# Patient Record
Sex: MALE | Race: WHITE | URBAN - METROPOLITAN AREA
[De-identification: names, ages, dates, MRNs, and addresses within clinical notes are randomized per-mention and may not be internally consistent; named-entity substitution may affect disease eponyms.]

---

## 2017-08-04 ENCOUNTER — TELEPHONE (OUTPATIENT)
Dept: FAMILY MEDICINE | Facility: CLINIC | Age: 47
End: 2017-08-04

## 2017-08-04 ENCOUNTER — NURSE TRIAGE (OUTPATIENT)
Dept: NURSING | Facility: CLINIC | Age: 47
End: 2017-08-04

## 2017-08-04 ENCOUNTER — OFFICE VISIT (OUTPATIENT)
Dept: FAMILY MEDICINE | Facility: CLINIC | Age: 47
End: 2017-08-04
Payer: COMMERCIAL

## 2017-08-04 VITALS
DIASTOLIC BLOOD PRESSURE: 74 MMHG | TEMPERATURE: 100.4 F | HEART RATE: 113 BPM | BODY MASS INDEX: 29.71 KG/M2 | SYSTOLIC BLOOD PRESSURE: 118 MMHG | RESPIRATION RATE: 18 BRPM | OXYGEN SATURATION: 96 % | HEIGHT: 67 IN | WEIGHT: 189.3 LBS

## 2017-08-04 DIAGNOSIS — R07.0 THROAT PAIN: Primary | ICD-10-CM

## 2017-08-04 LAB
DEPRECATED S PYO AG THROAT QL EIA: ABNORMAL
MICRO REPORT STATUS: ABNORMAL
SPECIMEN SOURCE: ABNORMAL

## 2017-08-04 PROCEDURE — 99213 OFFICE O/P EST LOW 20 MIN: CPT | Performed by: FAMILY MEDICINE

## 2017-08-04 PROCEDURE — 87880 STREP A ASSAY W/OPTIC: CPT | Performed by: FAMILY MEDICINE

## 2017-08-04 NOTE — TELEPHONE ENCOUNTER
Pt expecting abx to be sent to pharmacy for pos strep test  Please send if indicated    CB# [phone]    Route to provider to review and advise    Marva RN Nurse Triage

## 2017-08-04 NOTE — MR AVS SNAPSHOT
"              After Visit Summary   8/4/2017    Armond Tilley    MRN: [id number]           Patient Information     Date Of Birth          1970        Visit Information        Provider Department      8/4/2017 3:00 PM Umair Goodrich MD [facility]        Today's Diagnoses     Throat pain    -  1       Follow-ups after your visit        Who to contact     If you have questions or need follow up information about today's clinic visit or your schedule please contact [facility] directly at [phone].  Normal or non-critical lab and imaging results will be communicated to you by [employer], letter or phone within 4 business days after the clinic has received the results. If you do not hear from us within 7 days, please contact the clinic through [employer] or phone. If you have a critical or abnormal lab result, we will notify you by phone as soon as possible.  Submit refill requests through [employer] or call your pharmacy and they will forward the refill request to us. Please allow 3 business days for your refill to be completed.          Additional Information About Your Visit        MyChart Information     [employer] gives you secure access to your electronic health record. If you see a primary care provider, you can also send messages to your care team and make appointments. If you have questions, please call your primary care clinic.  If you do not have a primary care provider, please call [phone] and they will assist you.        Care EveryWhere ID     This is your Care EveryWhere ID. This could be used by other organizations to access your [facility] medical records  [id number]        Your Vitals Were     Pulse Temperature Respirations Height Pulse Oximetry BMI (Body Mass Index)    113 100.4  F (38  C) (Oral) 18 5' 6.5\" (1.689 m) 96% 30.1 kg/m2       Blood Pressure from Last 3 Encounters:   08/04/17 118/74   09/30/14 121/77   08/28/13 110/66    Weight from Last 3 Encounters: "   08/04/17 189 lb 4.8 oz (85.9 kg)   09/30/14 189 lb (85.7 kg)   08/28/13 179 lb (81.2 kg)              We Performed the Following     Strep, Rapid Screen        Primary Care Provider    None Specified       No primary provider on file.        Equal Access to Services     GRACIE LOMAS : Hadii aad ku hadtomo Soomaali, waaxda luqadaha, qaybta kaalmada adeegyada, keyana maya . So [facility] [phone].    ATENCIÓN: Si habla español, tiene a pettit disposición servicios gratuitos de asistencia lingüística. Llame al [phone].    We comply with applicable federal civil rights laws and [place] laws. We do not discriminate on the basis of race, color, national origin, age, disability sex, sexual orientation or gender identity.            Thank you!     Thank you for choosing [facility]  for your care. Our goal is always to provide you with excellent care. Hearing back from our patients is one way we can continue to improve our services. Please take a few minutes to complete the written survey that you may receive in the mail after your visit with us. Thank you!             Your Updated Medication List - Protect others around you: Learn how to safely use, store and throw away your medicines at www.disposemymeds.org.          This list is accurate as of: 8/4/17  3:53 PM.  Always use your most recent med list.                   Brand Name Dispense Instructions for use Diagnosis    OMEPRAZOLE PO      Take 20 mg by mouth daily

## 2017-08-04 NOTE — TELEPHONE ENCOUNTER
Additional Information    Caller has medication question, adult has minor symptoms, caller declines triage, and triager answers question    Protocols used: MEDICATION QUESTION CALL-ADULT-AH

## 2017-08-04 NOTE — PROGRESS NOTES
SUBJECTIVE:                                                    Armond Tilley is a 46 year old male who presents to clinic today for the following health issues:      Sore throat and fever started yesterday. No strep exposure known. Has had some bleeding from the left and right side of his throat. More from the right side of his throat    He has had a history of strep pharyngitis as an adult    OBJECTIVE:   Vitals as noted above.  Appears moderate distress.  Ears: normal  Oropharynx: marked erythema   Neck: supple and moderate nontender anterior cervical nodes  Lungs: chest clear to IPPA and clear to IPPA  Rapid Strep test is positive    ASSESSMENT: Streptococcal pharyngitis    PLAN: Per orders. Gargle, use acetaminophen or other OTC analgesic, and take Rx fully as prescribed. Call if other family members develop similar symptoms. See prn.

## 2019-12-16 ENCOUNTER — HEALTH MAINTENANCE LETTER (OUTPATIENT)
Age: 49
End: 2019-12-16

## 2021-01-15 ENCOUNTER — HEALTH MAINTENANCE LETTER (OUTPATIENT)
Age: 51
End: 2021-01-15

## 2021-09-05 ENCOUNTER — HEALTH MAINTENANCE LETTER (OUTPATIENT)
Age: 51
End: 2021-09-05

## 2022-02-19 ENCOUNTER — HEALTH MAINTENANCE LETTER (OUTPATIENT)
Age: 52
End: 2022-02-19

## 2022-10-22 ENCOUNTER — HEALTH MAINTENANCE LETTER (OUTPATIENT)
Age: 52
End: 2022-10-22

## 2023-03-20 NOTE — NURSING NOTE
"Chief Complaint   Patient presents with     Pharyngitis       Initial /74  Pulse 113  Temp 100.4  F (38  C) (Oral)  Resp 18  Ht 5' 6.5\" (1.689 m)  Wt 189 lb 4.8 oz (85.9 kg)  SpO2 96%  BMI 30.1 kg/m2 Estimated body mass index is 30.1 kg/(m^2) as calculated from the following:    Height as of this encounter: 5' 6.5\" (1.689 m).    Weight as of this encounter: 189 lb 4.8 oz (85.9 kg).  Medication Reconciliation: complete       Bernarda Goode CMA      "
No

## 2023-04-01 ENCOUNTER — HEALTH MAINTENANCE LETTER (OUTPATIENT)
Age: 53
End: 2023-04-01